# Patient Record
Sex: MALE | Race: WHITE | NOT HISPANIC OR LATINO
[De-identification: names, ages, dates, MRNs, and addresses within clinical notes are randomized per-mention and may not be internally consistent; named-entity substitution may affect disease eponyms.]

---

## 2017-07-12 PROBLEM — Z00.00 ENCOUNTER FOR PREVENTIVE HEALTH EXAMINATION: Status: ACTIVE | Noted: 2017-07-12

## 2017-12-19 ENCOUNTER — RECORD ABSTRACTING (OUTPATIENT)
Age: 72
End: 2017-12-19

## 2017-12-19 ENCOUNTER — APPOINTMENT (OUTPATIENT)
Dept: CARDIOLOGY | Facility: CLINIC | Age: 72
End: 2017-12-19
Payer: MEDICARE

## 2017-12-19 ENCOUNTER — NON-APPOINTMENT (OUTPATIENT)
Age: 72
End: 2017-12-19

## 2017-12-19 VITALS
HEIGHT: 77 IN | BODY MASS INDEX: 22.43 KG/M2 | WEIGHT: 190 LBS | DIASTOLIC BLOOD PRESSURE: 60 MMHG | SYSTOLIC BLOOD PRESSURE: 120 MMHG | HEART RATE: 82 BPM | OXYGEN SATURATION: 98 %

## 2017-12-19 DIAGNOSIS — Z80.0 FAMILY HISTORY OF MALIGNANT NEOPLASM OF DIGESTIVE ORGANS: ICD-10-CM

## 2017-12-19 DIAGNOSIS — Z82.49 FAMILY HISTORY OF ISCHEMIC HEART DISEASE AND OTHER DISEASES OF THE CIRCULATORY SYSTEM: ICD-10-CM

## 2017-12-19 DIAGNOSIS — Z83.3 FAMILY HISTORY OF DIABETES MELLITUS: ICD-10-CM

## 2017-12-19 PROCEDURE — 99214 OFFICE O/P EST MOD 30 MIN: CPT

## 2017-12-19 PROCEDURE — 93000 ELECTROCARDIOGRAM COMPLETE: CPT

## 2017-12-19 RX ORDER — ASPIRIN 325 MG/1
325 TABLET, FILM COATED ORAL DAILY
Refills: 0 | Status: ACTIVE | COMMUNITY

## 2017-12-19 RX ORDER — MULTIVITAMIN
TABLET ORAL
Refills: 0 | Status: ACTIVE | COMMUNITY

## 2017-12-19 RX ORDER — UBIDECARENONE/VIT E ACET 100MG-5
50 MCG CAPSULE ORAL DAILY
Refills: 0 | Status: ACTIVE | COMMUNITY

## 2017-12-21 ENCOUNTER — MEDICATION RENEWAL (OUTPATIENT)
Age: 72
End: 2017-12-21

## 2018-04-12 ENCOUNTER — APPOINTMENT (OUTPATIENT)
Dept: CARDIOLOGY | Facility: CLINIC | Age: 73
End: 2018-04-12
Payer: MEDICARE

## 2018-04-12 PROCEDURE — 93306 TTE W/DOPPLER COMPLETE: CPT

## 2018-04-12 PROCEDURE — 93880 EXTRACRANIAL BILAT STUDY: CPT

## 2018-04-16 PROCEDURE — 93224 XTRNL ECG REC UP TO 48 HRS: CPT

## 2018-05-04 ENCOUNTER — APPOINTMENT (OUTPATIENT)
Dept: CARDIOLOGY | Facility: CLINIC | Age: 73
End: 2018-05-04
Payer: MEDICARE

## 2018-05-04 ENCOUNTER — RECORD ABSTRACTING (OUTPATIENT)
Age: 73
End: 2018-05-04

## 2018-05-04 VITALS
DIASTOLIC BLOOD PRESSURE: 60 MMHG | HEART RATE: 76 BPM | HEIGHT: 77 IN | SYSTOLIC BLOOD PRESSURE: 122 MMHG | BODY MASS INDEX: 21.37 KG/M2 | OXYGEN SATURATION: 97 % | WEIGHT: 181 LBS

## 2018-05-04 PROCEDURE — 99214 OFFICE O/P EST MOD 30 MIN: CPT

## 2018-05-04 RX ORDER — HYDROCODONE BITARTRATE AND ACETAMINOPHEN 5; 325 MG/1; MG/1
5-325 TABLET ORAL
Qty: 10 | Refills: 0 | Status: DISCONTINUED | COMMUNITY
Start: 2018-01-23

## 2018-05-04 RX ORDER — CEPHALEXIN 500 MG/1
500 CAPSULE ORAL
Qty: 4 | Refills: 0 | Status: DISCONTINUED | COMMUNITY
Start: 2018-01-15

## 2018-11-01 ENCOUNTER — RECORD ABSTRACTING (OUTPATIENT)
Age: 73
End: 2018-11-01

## 2018-11-06 ENCOUNTER — MEDICATION RENEWAL (OUTPATIENT)
Age: 73
End: 2018-11-06

## 2018-11-06 ENCOUNTER — APPOINTMENT (OUTPATIENT)
Dept: CARDIOLOGY | Facility: CLINIC | Age: 73
End: 2018-11-06
Payer: MEDICARE

## 2018-11-06 VITALS
WEIGHT: 179 LBS | BODY MASS INDEX: 21.13 KG/M2 | OXYGEN SATURATION: 98 % | DIASTOLIC BLOOD PRESSURE: 64 MMHG | HEIGHT: 77 IN | HEART RATE: 77 BPM | SYSTOLIC BLOOD PRESSURE: 118 MMHG

## 2018-11-06 DIAGNOSIS — Z84.1 FAMILY HISTORY OF DISORDERS OF KIDNEY AND URETER: ICD-10-CM

## 2018-11-06 DIAGNOSIS — Z86.79 PERSONAL HISTORY OF OTHER DISEASES OF THE CIRCULATORY SYSTEM: ICD-10-CM

## 2018-11-06 DIAGNOSIS — I36.1 NONRHEUMATIC TRICUSPID (VALVE) INSUFFICIENCY: ICD-10-CM

## 2018-11-06 DIAGNOSIS — I49.5 SICK SINUS SYNDROME: ICD-10-CM

## 2018-11-06 DIAGNOSIS — Z87.898 PERSONAL HISTORY OF OTHER SPECIFIED CONDITIONS: ICD-10-CM

## 2018-11-06 PROCEDURE — 99214 OFFICE O/P EST MOD 30 MIN: CPT

## 2018-11-07 NOTE — DISCUSSION/SUMMARY
[FreeTextEntry1] : I saw Dylan today.  He is here with his wife.  He is doing very well.  He has had no palpitation, dizziness, or lightheadedness.  \par He had ablation for atrial flutter and PSVT.  It was performed by Dr. Stewart Calhoun.  He had follow up with him on September 17, 2018.  I reviewed his consult and recommendations.  He is doing well.  He has a follow up appointment in two years.  \par He has had no chest pain, palpitation, dizziness, or syncope.  He is 10 years out from atrial fibrillation ablation without any clinical recurrence.  He has had yearly Holter, ECG, etc.  We reviewed all his lab work, CBC, CMP, thyroid profile, magnesium, and hemoglobin A1c are all within normal limits.  \par His PSA is normal at 1.33.  He does not have significant prostate symptoms.  His B12 level, folic acid, and thyroid are all unremarkable so as the urine.  He was checked for Lyme titer, which was negative.  \par He has history of PSVT, so we will repeat the Holter monitor some time in April.  \par He has thickened mitral valve with mild-to-moderate mitral regurgitation, mild-to-moderate tricuspid and pulmonic regurgitation.  He has mild pulmonary hypertension at 49 mmHg last year.  So, we will recheck the echo to evaluate the valves and to make sure there are no segmental wall motion abnormalities.  He has clinically no signs of congestive heart failure.  \par His carotids have been normal in the past.  He used to have mild dizziness in the past, but he has been doing very well.  He works full-time as an .  \par Reviewed all the medications, reconciled his list of medicines with ours.  So, he will call us if there are any new symptoms.  We will see him after the blood work in April and also the echo carotid and Holter.

## 2019-04-09 ENCOUNTER — RECORD ABSTRACTING (OUTPATIENT)
Age: 74
End: 2019-04-09

## 2019-04-10 ENCOUNTER — APPOINTMENT (OUTPATIENT)
Dept: CARDIOLOGY | Facility: CLINIC | Age: 74
End: 2019-04-10
Payer: MEDICARE

## 2019-04-10 PROCEDURE — 93306 TTE W/DOPPLER COMPLETE: CPT

## 2019-04-10 PROCEDURE — 93880 EXTRACRANIAL BILAT STUDY: CPT

## 2019-04-12 PROCEDURE — 93224 XTRNL ECG REC UP TO 48 HRS: CPT

## 2019-04-16 ENCOUNTER — APPOINTMENT (OUTPATIENT)
Dept: CARDIOLOGY | Facility: CLINIC | Age: 74
End: 2019-04-16
Payer: MEDICARE

## 2019-04-16 VITALS
DIASTOLIC BLOOD PRESSURE: 80 MMHG | HEIGHT: 77 IN | HEART RATE: 60 BPM | OXYGEN SATURATION: 100 % | BODY MASS INDEX: 21.13 KG/M2 | SYSTOLIC BLOOD PRESSURE: 120 MMHG | WEIGHT: 179 LBS

## 2019-04-16 DIAGNOSIS — I48.0 PAROXYSMAL ATRIAL FIBRILLATION: ICD-10-CM

## 2019-04-16 DIAGNOSIS — I47.1 SUPRAVENTRICULAR TACHYCARDIA: ICD-10-CM

## 2019-04-16 DIAGNOSIS — I05.9 RHEUMATIC MITRAL VALVE DISEASE, UNSPECIFIED: ICD-10-CM

## 2019-04-16 DIAGNOSIS — I07.9 RHEUMATIC TRICUSPID VALVE DISEASE, UNSPECIFIED: ICD-10-CM

## 2019-04-16 DIAGNOSIS — I48.92 UNSPECIFIED ATRIAL FLUTTER: ICD-10-CM

## 2019-04-16 DIAGNOSIS — I27.20 PULMONARY HYPERTENSION, UNSPECIFIED: ICD-10-CM

## 2019-04-16 DIAGNOSIS — I83.93 ASYMPTOMATIC VARICOSE VEINS OF BILATERAL LOWER EXTREMITIES: ICD-10-CM

## 2019-04-16 PROCEDURE — 99214 OFFICE O/P EST MOD 30 MIN: CPT

## 2019-04-16 RX ORDER — METOPROLOL SUCCINATE 50 MG/1
50 TABLET, EXTENDED RELEASE ORAL DAILY
Qty: 90 | Refills: 3 | Status: ACTIVE | COMMUNITY
Start: 2016-12-03 | End: 1900-01-01

## 2019-04-16 NOTE — PHYSICAL EXAM
[Normal Appearance] : normal appearance [Eyelids - No Xanthelasma] : the eyelids demonstrated no xanthelasmas [Normal Oral Mucosa] : normal oral mucosa [Normal Oropharynx] : normal oropharynx [Normal Jugular Venous V Waves Present] : normal jugular venous V waves present [Respiration, Rhythm And Depth] : normal respiratory rhythm and effort [Exaggerated Use Of Accessory Muscles For Inspiration] : no accessory muscle use [Auscultation Breath Sounds / Voice Sounds] : lungs were clear to auscultation bilaterally [Heart Rate And Rhythm] : heart rate and rhythm were normal [Heart Sounds] : normal S1 and S2 [Edema] : no peripheral edema present [Bowel Sounds] : normal bowel sounds [Abdomen Soft] : soft [Abdomen Tenderness] : non-tender [Abnormal Walk] : normal gait [Petechial Hemorrhages (___cm)] : no petechial hemorrhages [] : no rash [Oriented To Time, Place, And Person] : oriented to person, place, and time

## 2019-04-17 NOTE — DISCUSSION/SUMMARY
[FreeTextEntry1] : I saw Dylan today.  He is here with his wife.  He feels well.  He has not had any dizziness or chest pain.  No rapid heart beat.  No PSVT.  He occasionally has little discomfort especially if he walks uphill a lot or up on the stairs.  No orthopnea or PND.  No fever or chills.  No calf pain.  His blood work was unremarkable last week.  We reviewed the consult and recommendations of electrophysiologist, Dr. Stewart Calhoun and he also had follow up with Dr. Ponce, vascular surgeon.  He has no history of PSVT.  \par He had a 24-hour Holter monitor.  He remained in sinus rhythm, minimum rate 54, maximum 106, sinus arrhythmia, rare to occasional PACs, one sequential of 2 beats at 127 beats per minute, rare multifocal ventricular premature beats with no couplets.  \par We will have blood work to check CBC, thyroid, magnesium, and blood sugar.  We reviewed his medications, reconciled his list of medicines with ours.  He will continue with present medical regimen.\par \par I saw Dylan today.  He is here with his wife.  He is doing very well.  He has had no palpitation, dizziness, or lightheadedness.  \par He had ablation for atrial flutter and PSVT.  It was performed by Dr. Stewart Calhoun.  He had follow up with him on September 17, 2018.  I reviewed his consult and recommendations.  He is doing well.  He has a follow up appointment in two years.  \par \par He has thickened mitral valve with mild-to-moderate mitral regurgitation, mild-to-moderate tricuspid and pulmonic regurgitation.  He has mild pulmonary hypertension at 49 mmHg last year.  So, we will recheck the echo to evaluate the valves and to make sure there are no segmental wall motion abnormalities.  He has clinically no signs of congestive heart failure.  \par His carotids have been normal in the past.  He used to have mild dizziness in the past, but he has been doing very well.  He works full-time as an .  \par Reviewed all the medications, reconciled his list of medicines with ours.  So, he will call us if there are any new symptoms.

## 2019-04-17 NOTE — HISTORY OF PRESENT ILLNESS
[FreeTextEntry1] : I saw Dylan today.  He is here with his wife.  He feels well.  He has not had any dizziness or chest pain.  No rapid heart beat.  No PSVT.  He occasionally has little discomfort especially if he walks uphill a lot or up on the stairs.  No orthopnea or PND.  No fever or chills.  No calf pain.  His blood work was unremarkable last week.  We reviewed the consult and recommendations of electrophysiologist, Dr. Stewart Calhoun and he also had follow up with Dr. Ponce, vascular surgeon.  He has no history of PSVT.  \par He had a 24-hour Holter monitor.  He remained in sinus rhythm, minimum rate 54, maximum 106, sinus arrhythmia, rare to occasional PACs, one sequential of 2 beats at 127 beats per minute, rare multifocal ventricular premature beats with no couplets.  \par We will have blood work to check CBC, thyroid, magnesium, and blood sugar.  We reviewed his medications, reconciled his list of medicines with ours.  He will continue with present medical regimen.\par \par I saw Dylan today.  He is here with his wife.  He is doing very well.  He has had no palpitation, dizziness, or lightheadedness.  \par He had ablation for atrial flutter and PSVT.  It was performed by Dr. Stewart Calhoun.  He had follow up with him on September 17, 2018.  I reviewed his consult and recommendations.  He is doing well.  He has a follow up appointment in two years.  \par \par He has thickened mitral valve with mild-to-moderate mitral regurgitation, mild-to-moderate tricuspid and pulmonic regurgitation.  He has mild pulmonary hypertension at 49 mmHg last year.  So, we will recheck the echo to evaluate the valves and to make sure there are no segmental wall motion abnormalities.  He has clinically no signs of congestive heart failure.  \par His carotids have been normal in the past.  He used to have mild dizziness in the past, but he has been doing very well.  He works full-time as an .  \par Reviewed all the medications, reconciled his list of medicines with ours.  So, he will call us if there are any new symptoms.\par

## 2019-08-16 ENCOUNTER — APPOINTMENT (OUTPATIENT)
Dept: CARDIOLOGY | Facility: CLINIC | Age: 74
End: 2019-08-16

## 2019-09-02 PROBLEM — I36.1 NON-RHEUMATIC TRICUSPID VALVE INSUFFICIENCY: Status: RESOLVED | Noted: 2018-11-06 | Resolved: 2019-09-02

## 2020-08-20 PROBLEM — I07.9 TRICUSPID VALVE DISORDER: Status: ACTIVE | Noted: 2017-12-19

## 2023-01-23 ENCOUNTER — OFFICE (OUTPATIENT)
Dept: URBAN - METROPOLITAN AREA CLINIC 8 | Facility: CLINIC | Age: 78
Setting detail: OPHTHALMOLOGY
End: 2023-01-23
Payer: MEDICARE

## 2023-01-23 DIAGNOSIS — H02.831: ICD-10-CM

## 2023-01-23 DIAGNOSIS — H25.13: ICD-10-CM

## 2023-01-23 DIAGNOSIS — H02.834: ICD-10-CM

## 2023-01-23 DIAGNOSIS — H40.013: ICD-10-CM

## 2023-01-23 PROCEDURE — 92014 COMPRE OPH EXAM EST PT 1/>: CPT | Performed by: OPHTHALMOLOGY

## 2023-01-23 PROCEDURE — 92133 CPTRZD OPH DX IMG PST SGM ON: CPT | Performed by: OPHTHALMOLOGY

## 2023-01-23 ASSESSMENT — CONFRONTATIONAL VISUAL FIELD TEST (CVF)
OS_FINDINGS: FULL
OD_FINDINGS: FULL

## 2023-01-23 ASSESSMENT — KERATOMETRY
OD_K1POWER_DIOPTERS: 43.25
OS_K2POWER_DIOPTERS: 44.75
METHOD_AUTO_MANUAL: AUTO
OD_AXISANGLE_DEGREES: 165
OS_K1POWER_DIOPTERS: 43.50
OD_K2POWER_DIOPTERS: 44.25
OS_AXISANGLE_DEGREES: 004

## 2023-01-23 ASSESSMENT — SPHEQUIV_DERIVED
OS_SPHEQUIV: 2.375
OS_SPHEQUIV: 0.75
OD_SPHEQUIV: 1
OD_SPHEQUIV: 1.25
OD_SPHEQUIV: 2.125

## 2023-01-23 ASSESSMENT — REFRACTION_MANIFEST
OU_VA: 20/20
OS_ADD: +1.50
OS_AXIS: 120
OD_AXIS: 60
OD_CYLINDER: -0.50
OD_VA1: 20/20-2
OD_CYLINDER: -0.50
OU_VA: 20/20
OS_AXIS: 120
OS_CYLINDER: -0.50
OS_CYLINDER: SPH
OS_VA1: 20/25+2
OD_SPHERE: +1.50
OD_ADD: +1.50
OD_AXIS: 085
OD_SPHERE: +1.25
OS_VA1: 20/25+2
OD_ADD: +2.75
OD_VA1: 20/20-2
OS_SPHERE: +1.00
OS_SPHERE: +1.00
OS_ADD: +2.75

## 2023-01-23 ASSESSMENT — VISUAL ACUITY
OS_BCVA: 20/40-
OD_BCVA: 20/40-

## 2023-01-23 ASSESSMENT — AXIALLENGTH_DERIVED
OD_AL: 23.0269
OD_AL: 23.1201
OD_AL: 22.7067
OS_AL: 22.4909
OS_AL: 23.0814

## 2023-01-23 ASSESSMENT — LID POSITION - DERMATOCHALASIS
OS_DERMATOCHALASIS: LUL 2+
OD_DERMATOCHALASIS: RUL 2+

## 2023-01-23 ASSESSMENT — REFRACTION_CURRENTRX
OD_VPRISM_DIRECTION: SV
OS_SPHERE: +2.00
OD_SPHERE: +1.25
OS_CYLINDER: -0.50
OD_CYLINDER: -0.50
OD_SPHERE: +2.00
OS_AXIS: 120
OS_VPRISM_DIRECTION: SV
OD_OVR_VA: 20/
OS_OVR_VA: 20/
OS_OVR_VA: 20/
OS_VPRISM_DIRECTION: SV
OS_SPHERE: +1.25
OD_OVR_VA: 20/
OD_VPRISM_DIRECTION: SV
OD_AXIS: 060

## 2023-01-23 ASSESSMENT — PACHYMETRY
OD_CT_UM: 514
OS_CT_UM: 514
OD_CT_CORRECTION: 2
OS_CT_CORRECTION: 2

## 2023-01-23 ASSESSMENT — REFRACTION_AUTOREFRACTION
OS_AXIS: 096
OS_CYLINDER: -1.25
OD_AXIS: 086
OD_SPHERE: +2.75
OD_CYLINDER: -1.25
OS_SPHERE: +3.00

## 2023-01-23 ASSESSMENT — TONOMETRY
OD_IOP_MMHG: 18
OS_IOP_MMHG: 18

## 2023-07-05 ENCOUNTER — APPOINTMENT (OUTPATIENT)
Dept: ORTHOPEDIC SURGERY | Facility: CLINIC | Age: 78
End: 2023-07-05
Payer: MEDICARE

## 2023-07-05 DIAGNOSIS — M17.12 UNILATERAL PRIMARY OSTEOARTHRITIS, LEFT KNEE: ICD-10-CM

## 2023-07-05 PROCEDURE — 99203 OFFICE O/P NEW LOW 30 MIN: CPT

## 2023-07-05 NOTE — DISCUSSION/SUMMARY
[de-identified] : I reviewed patient's radiographs and discussed his condition and treatment options with patient and his wife.  I advised wearing knee brace for next 2 weeks then weaning out of it.  Patient voiced understanding and agreement with the plan.\par

## 2023-07-05 NOTE — HISTORY OF PRESENT ILLNESS
[de-identified] : Patient presents for evaluation on LT knee pain. States he had a fall on a stone path. Presented to Dr. Wisdom's office for x-ray same day, which showed no fracture. Patient is concerned for meniscal tear. Spouse states he was icing using cane to help ambulate. Days passed and symptoms seemed to resolve. Patient is using brace and taking Motrin as needed.

## 2023-07-05 NOTE — PHYSICAL EXAM
[NL (0)] : extension 0 degrees [4___] : hamstring 4[unfilled]/5 [] : patient ambulates without assistive device [Left] : left knee [AP] : anteroposterior [Lateral] : lateral [There are no fractures, subluxations or dislocations. No significant abnormalities are seen] : There are no fractures, subluxations or dislocations. No significant abnormalities are seen [Moderate patellofemoral OA] : Moderate patellofemoral OA [[id number]] : flexion 120 degrees

## 2023-08-07 ENCOUNTER — OFFICE (OUTPATIENT)
Dept: URBAN - METROPOLITAN AREA CLINIC 8 | Facility: CLINIC | Age: 78
Setting detail: OPHTHALMOLOGY
End: 2023-08-07
Payer: MEDICARE

## 2023-08-07 DIAGNOSIS — H52.4: ICD-10-CM

## 2023-08-07 DIAGNOSIS — H02.834: ICD-10-CM

## 2023-08-07 DIAGNOSIS — H02.831: ICD-10-CM

## 2023-08-07 DIAGNOSIS — H25.13: ICD-10-CM

## 2023-08-07 DIAGNOSIS — H40.013: ICD-10-CM

## 2023-08-07 PROCEDURE — 92015 DETERMINE REFRACTIVE STATE: CPT | Performed by: OPHTHALMOLOGY

## 2023-08-07 PROCEDURE — 99213 OFFICE O/P EST LOW 20 MIN: CPT | Performed by: OPHTHALMOLOGY

## 2023-08-07 PROCEDURE — 92083 EXTENDED VISUAL FIELD XM: CPT | Performed by: OPHTHALMOLOGY

## 2023-08-07 ASSESSMENT — REFRACTION_MANIFEST
OU_VA: 20/20
OS_CYLINDER: -0.50
OD_AXIS: 60
OD_CYLINDER: -0.50
OD_SPHERE: +1.25
OD_ADD: +1.50
OS_VA1: 20/25+2
OD_VA1: 20/20-2
OD_ADD: +2.75
OD_SPHERE: +1.50
OD_CYLINDER: -0.50
OS_ADD: +2.75
OD_VA1: 20/20-2
OS_ADD: +1.50
OS_VA1: 20/25+2
OS_AXIS: 120
OD_AXIS: 085
OU_VA: 20/20
OS_SPHERE: +1.00
OS_SPHERE: +1.00
OS_CYLINDER: SPH
OS_AXIS: 120

## 2023-08-07 ASSESSMENT — REFRACTION_CURRENTRX
OD_SPHERE: +2.00
OD_SPHERE: +1.25
OS_SPHERE: +2.00
OS_VPRISM_DIRECTION: SV
OD_OVR_VA: 20/
OD_CYLINDER: -0.50
OD_VPRISM_DIRECTION: SV
OS_AXIS: 120
OD_OVR_VA: 20/
OS_OVR_VA: 20/
OS_OVR_VA: 20/
OD_AXIS: 060
OS_CYLINDER: -0.50
OS_SPHERE: +1.25
OS_VPRISM_DIRECTION: SV
OD_VPRISM_DIRECTION: SV

## 2023-08-07 ASSESSMENT — SPHEQUIV_DERIVED
OS_SPHEQUIV: 2.375
OS_SPHEQUIV: 0.75
OD_SPHEQUIV: 1.25
OD_SPHEQUIV: 2.125
OD_SPHEQUIV: 1

## 2023-08-07 ASSESSMENT — REFRACTION_AUTOREFRACTION
OD_AXIS: 086
OD_CYLINDER: -1.25
OS_SPHERE: +3.00
OS_AXIS: 096
OS_CYLINDER: -1.25
OD_SPHERE: +2.75

## 2023-08-07 ASSESSMENT — VISUAL ACUITY
OD_BCVA: 20/30-2
OS_BCVA: 20/30-

## 2023-08-07 ASSESSMENT — AXIALLENGTH_DERIVED
OD_AL: 23.0269
OS_AL: 22.4909
OD_AL: 22.7067
OS_AL: 23.0814
OD_AL: 23.1201

## 2023-08-07 ASSESSMENT — TONOMETRY
OD_IOP_MMHG: 17
OS_IOP_MMHG: 17

## 2023-08-07 ASSESSMENT — KERATOMETRY
METHOD_AUTO_MANUAL: AUTO
OS_K2POWER_DIOPTERS: 44.75
OS_AXISANGLE_DEGREES: 004
OD_K2POWER_DIOPTERS: 44.25
OD_K1POWER_DIOPTERS: 43.25
OS_K1POWER_DIOPTERS: 43.50
OD_AXISANGLE_DEGREES: 165

## 2023-08-07 ASSESSMENT — PACHYMETRY
OS_CT_UM: 514
OS_CT_CORRECTION: 2
OD_CT_UM: 514
OD_CT_CORRECTION: 2

## 2023-08-07 ASSESSMENT — CONFRONTATIONAL VISUAL FIELD TEST (CVF)
OD_FINDINGS: FULL
OS_FINDINGS: FULL

## 2023-08-07 ASSESSMENT — LID POSITION - DERMATOCHALASIS
OS_DERMATOCHALASIS: LUL 2+
OD_DERMATOCHALASIS: RUL 2+

## 2024-02-12 ENCOUNTER — OFFICE (OUTPATIENT)
Dept: URBAN - METROPOLITAN AREA CLINIC 8 | Facility: CLINIC | Age: 79
Setting detail: OPHTHALMOLOGY
End: 2024-02-12
Payer: MEDICARE

## 2024-02-12 DIAGNOSIS — H02.834: ICD-10-CM

## 2024-02-12 DIAGNOSIS — H02.831: ICD-10-CM

## 2024-02-12 DIAGNOSIS — H40.013: ICD-10-CM

## 2024-02-12 DIAGNOSIS — H25.13: ICD-10-CM

## 2024-02-12 PROBLEM — H40.1131 GLAUCOMA-PRIMARY OPEN ANGLE; BOTH EYES MILD: Status: ACTIVE | Noted: 2024-02-12

## 2024-02-12 PROCEDURE — 76514 ECHO EXAM OF EYE THICKNESS: CPT | Performed by: OPHTHALMOLOGY

## 2024-02-12 PROCEDURE — 92133 CPTRZD OPH DX IMG PST SGM ON: CPT | Performed by: OPHTHALMOLOGY

## 2024-02-12 PROCEDURE — 99214 OFFICE O/P EST MOD 30 MIN: CPT | Performed by: OPHTHALMOLOGY

## 2024-02-12 ASSESSMENT — REFRACTION_AUTOREFRACTION
OD_SPHERE: +2.75
OD_AXIS: 086
OS_CYLINDER: -1.25
OS_AXIS: 096
OD_CYLINDER: -1.25
OS_SPHERE: +3.00

## 2024-02-12 ASSESSMENT — REFRACTION_MANIFEST
OS_CYLINDER: -0.50
OS_SPHERE: +1.00
OD_AXIS: 60
OU_VA: 20/20
OS_SPHERE: +1.00
OD_SPHERE: +1.25
OD_SPHERE: +1.50
OD_AXIS: 085
OS_AXIS: 120
OU_VA: 20/20
OS_ADD: +1.50
OS_VA1: 20/25+2
OS_AXIS: 120
OS_ADD: +2.75
OD_ADD: +1.50
OS_CYLINDER: SPH
OD_VA1: 20/20-2
OS_VA1: 20/25+2
OD_CYLINDER: -0.50
OD_VA1: 20/20-2
OD_ADD: +2.75
OD_CYLINDER: -0.50

## 2024-02-12 ASSESSMENT — REFRACTION_CURRENTRX
OS_VPRISM_DIRECTION: SV
OD_SPHERE: +2.00
OD_OVR_VA: 20/
OD_AXIS: 060
OD_VPRISM_DIRECTION: SV
OS_SPHERE: +2.00
OS_SPHERE: +1.25
OS_OVR_VA: 20/
OS_VPRISM_DIRECTION: SV
OD_CYLINDER: -0.50
OS_CYLINDER: -0.50
OD_VPRISM_DIRECTION: SV
OD_OVR_VA: 20/
OS_AXIS: 120
OS_OVR_VA: 20/
OD_SPHERE: +1.25

## 2024-02-12 ASSESSMENT — SPHEQUIV_DERIVED
OD_SPHEQUIV: 1
OD_SPHEQUIV: 1.25
OS_SPHEQUIV: 2.375
OD_SPHEQUIV: 2.125
OS_SPHEQUIV: 0.75

## 2024-02-12 ASSESSMENT — CONFRONTATIONAL VISUAL FIELD TEST (CVF)
OD_FINDINGS: FULL
OS_FINDINGS: FULL

## 2024-02-12 ASSESSMENT — LID POSITION - DERMATOCHALASIS
OS_DERMATOCHALASIS: LUL 2+
OD_DERMATOCHALASIS: RUL 2+

## 2024-04-22 ENCOUNTER — OFFICE (OUTPATIENT)
Dept: URBAN - METROPOLITAN AREA CLINIC 8 | Facility: CLINIC | Age: 79
Setting detail: OPHTHALMOLOGY
End: 2024-04-22
Payer: MEDICARE

## 2024-04-22 ENCOUNTER — RX ONLY (RX ONLY)
Age: 79
End: 2024-04-22

## 2024-04-22 DIAGNOSIS — H02.834: ICD-10-CM

## 2024-04-22 DIAGNOSIS — H25.13: ICD-10-CM

## 2024-04-22 DIAGNOSIS — H02.831: ICD-10-CM

## 2024-04-22 DIAGNOSIS — H40.1131: ICD-10-CM

## 2024-04-22 PROCEDURE — 99213 OFFICE O/P EST LOW 20 MIN: CPT | Performed by: OPHTHALMOLOGY

## 2024-04-22 ASSESSMENT — LID POSITION - DERMATOCHALASIS
OS_DERMATOCHALASIS: LUL 2+
OD_DERMATOCHALASIS: RUL 2+

## 2024-09-25 ENCOUNTER — OFFICE (OUTPATIENT)
Dept: URBAN - METROPOLITAN AREA CLINIC 8 | Facility: CLINIC | Age: 79
Setting detail: OPHTHALMOLOGY
End: 2024-09-25
Payer: MEDICARE

## 2024-09-25 DIAGNOSIS — H40.1131: ICD-10-CM

## 2024-09-25 DIAGNOSIS — H02.834: ICD-10-CM

## 2024-09-25 DIAGNOSIS — H02.831: ICD-10-CM

## 2024-09-25 DIAGNOSIS — H52.4: ICD-10-CM

## 2024-09-25 DIAGNOSIS — H25.13: ICD-10-CM

## 2024-09-25 PROCEDURE — 92015 DETERMINE REFRACTIVE STATE: CPT | Performed by: OPHTHALMOLOGY

## 2024-09-25 PROCEDURE — 92083 EXTENDED VISUAL FIELD XM: CPT | Performed by: OPHTHALMOLOGY

## 2024-09-25 PROCEDURE — 99214 OFFICE O/P EST MOD 30 MIN: CPT | Performed by: OPHTHALMOLOGY

## 2024-09-25 ASSESSMENT — CONFRONTATIONAL VISUAL FIELD TEST (CVF)
OS_FINDINGS: FULL
OD_FINDINGS: FULL

## 2024-09-25 ASSESSMENT — LID POSITION - DERMATOCHALASIS
OS_DERMATOCHALASIS: LUL 2+
OD_DERMATOCHALASIS: RUL 2+

## 2025-03-26 ENCOUNTER — OFFICE (OUTPATIENT)
Dept: URBAN - METROPOLITAN AREA CLINIC 8 | Facility: CLINIC | Age: 80
Setting detail: OPHTHALMOLOGY
End: 2025-03-26
Payer: MEDICARE

## 2025-03-26 DIAGNOSIS — H52.4: ICD-10-CM

## 2025-03-26 DIAGNOSIS — L30.8: ICD-10-CM

## 2025-03-26 DIAGNOSIS — H40.1131: ICD-10-CM

## 2025-03-26 DIAGNOSIS — H02.831: ICD-10-CM

## 2025-03-26 DIAGNOSIS — H02.834: ICD-10-CM

## 2025-03-26 DIAGNOSIS — H25.13: ICD-10-CM

## 2025-03-26 PROCEDURE — 92015 DETERMINE REFRACTIVE STATE: CPT | Performed by: OPHTHALMOLOGY

## 2025-03-26 PROCEDURE — 99214 OFFICE O/P EST MOD 30 MIN: CPT | Performed by: OPHTHALMOLOGY

## 2025-03-26 PROCEDURE — 92133 CPTRZD OPH DX IMG PST SGM ON: CPT | Performed by: OPHTHALMOLOGY

## 2025-03-26 ASSESSMENT — REFRACTION_CURRENTRX
OS_OVR_VA: 20/
OS_SPHERE: +2.00
OS_VPRISM_DIRECTION: SV
OD_OVR_VA: 20/
OS_VPRISM_DIRECTION: SV
OD_SPHERE: +1.25
OD_CYLINDER: -0.50
OD_VPRISM_DIRECTION: SV
OD_OVR_VA: 20/
OD_VPRISM_DIRECTION: SV
OS_CYLINDER: -0.50
OS_AXIS: 120
OS_SPHERE: +1.25
OD_SPHERE: +2.00
OD_AXIS: 060
OS_OVR_VA: 20/

## 2025-03-26 ASSESSMENT — VISUAL ACUITY
OS_BCVA: 20/30-
OD_BCVA: 20/40-

## 2025-03-26 ASSESSMENT — KERATOMETRY
OS_AXISANGLE_DEGREES: 007
METHOD_AUTO_MANUAL: AUTO
OD_AXISANGLE_DEGREES: 166
OS_K2POWER_DIOPTERS: 45.00
OS_K1POWER_DIOPTERS: 43.50
OD_K1POWER_DIOPTERS: 43.50
OD_K2POWER_DIOPTERS: 44.75

## 2025-03-26 ASSESSMENT — REFRACTION_MANIFEST
OS_ADD: +1.00
OU_VA: 20/25
OD_CYLINDER: -0.50
OD_AXIS: 085
OD_ADD: +1.00
OS_SPHERE: +2.00
OS_VA1: 20/40-
OD_VA2: 20/20(J1+)
OS_VA2: 20/20(J1+)
OD_ADD: +2.50
OD_SPHERE: +2.25
OS_ADD: +2.50
OD_AXIS: 085
OS_VA1: 20/30-
OD_SPHERE: +2.25
OS_CYLINDER: SPH
OD_VA1: 20/30-
OD_VA1: 20/30-
OS_CYLINDER: SPH
OD_CYLINDER: -0.75
OS_SPHERE: +2.00

## 2025-03-26 ASSESSMENT — CONFRONTATIONAL VISUAL FIELD TEST (CVF)
OS_FINDINGS: FULL
OD_FINDINGS: FULL

## 2025-03-26 ASSESSMENT — TONOMETRY
OD_IOP_MMHG: 14
OS_IOP_MMHG: 14

## 2025-03-26 ASSESSMENT — PACHYMETRY
OS_CT_UM: 514
OD_CT_CORRECTION: 3
OD_CT_UM: 504
OS_CT_CORRECTION: 2

## 2025-03-26 ASSESSMENT — REFRACTION_AUTOREFRACTION
OS_CYLINDER: -2.00
OD_CYLINDER: -1.50
OS_SPHERE: +3.75
OD_AXIS: 086
OD_SPHERE: +2.75
OS_AXIS: 105

## 2025-03-26 ASSESSMENT — LID POSITION - DERMATOCHALASIS
OD_DERMATOCHALASIS: RUL 2+
OS_DERMATOCHALASIS: LUL 2+